# Patient Record
Sex: FEMALE | Race: BLACK OR AFRICAN AMERICAN | NOT HISPANIC OR LATINO | URBAN - METROPOLITAN AREA
[De-identification: names, ages, dates, MRNs, and addresses within clinical notes are randomized per-mention and may not be internally consistent; named-entity substitution may affect disease eponyms.]

---

## 2021-01-21 ENCOUNTER — OFFICE VISIT (OUTPATIENT)
Dept: URBAN - METROPOLITAN AREA CLINIC 35 | Facility: CLINIC | Age: 36
End: 2021-01-21

## 2021-02-03 ENCOUNTER — OFFICE VISIT (OUTPATIENT)
Dept: URBAN - METROPOLITAN AREA CLINIC 31 | Facility: CLINIC | Age: 36
End: 2021-02-03

## 2021-02-03 VITALS
DIASTOLIC BLOOD PRESSURE: 78 MMHG | SYSTOLIC BLOOD PRESSURE: 115 MMHG | OXYGEN SATURATION: 99 % | HEIGHT: 62 IN | WEIGHT: 140 LBS | BODY MASS INDEX: 25.76 KG/M2 | HEART RATE: 64 BPM

## 2021-02-03 PROBLEM — 71457002 OROPHARYNGEAL DYSPHAGIA: Status: ACTIVE | Noted: 2021-02-03

## 2021-02-03 RX ORDER — MULTIVITAMIN
1 TABLET TABLET ORAL ONCE A DAY
Qty: 30 | Status: ACTIVE | COMMUNITY

## 2021-02-03 NOTE — HPI-MIGRATED HPI
;     Acid Reflux : 36 y/o female patient admits recent onset of GERD/acid reflux. Patient admits that she has been experiencing symptoms for about 1 year.  Patient admits mild to severe discomfort in her chest. The symptoms are described as burning sensation of food getting stuck in her chest.  Patient is not taking any medication for relief.  Patient admits going to urgent care due to shortness of breath on (04/2020) and was prescribed antibiotics, which gave her diarrhea. Patient admits taking Benadryl which made her current conditions worse. Patient admits that she has been avoiding jelly, peanut butter, and acidic foods.  Patient admits excessive belching, dysphagia, bloating/gas, indigestion, early satiety.  Patient currently admits formed bowel movements every 3 days Patient denies blood, mucus, or melena in stools.  Patient notes that since she has been drinking Smooth-Move tea, her bowel movements are normalized.   Patient denies globus, sour eructations, changes in appetite, coughing, abdominal/epigastric pain, or changes in bowel habits.    Patient denies family hx of gastric/esophageal cancer or diseases. Patient denies past EGD which was performed in past.   ;

## 2021-02-23 ENCOUNTER — OFFICE VISIT (OUTPATIENT)
Dept: URBAN - METROPOLITAN AREA SURGERY CENTER 8 | Facility: SURGERY CENTER | Age: 36
End: 2021-02-23

## 2021-02-26 ENCOUNTER — TELEPHONE ENCOUNTER (OUTPATIENT)
Dept: URBAN - METROPOLITAN AREA CLINIC 35 | Facility: CLINIC | Age: 36
End: 2021-02-26

## 2021-03-03 ENCOUNTER — TELEPHONE ENCOUNTER (OUTPATIENT)
Dept: URBAN - METROPOLITAN AREA CLINIC 35 | Facility: CLINIC | Age: 36
End: 2021-03-03

## 2021-03-17 ENCOUNTER — DASHBOARD ENCOUNTERS (OUTPATIENT)
Age: 36
End: 2021-03-17

## 2021-03-17 ENCOUNTER — OFFICE VISIT (OUTPATIENT)
Dept: URBAN - METROPOLITAN AREA CLINIC 31 | Facility: CLINIC | Age: 36
End: 2021-03-17

## 2021-03-17 VITALS
HEART RATE: 79 BPM | BODY MASS INDEX: 24.84 KG/M2 | OXYGEN SATURATION: 97 % | WEIGHT: 135 LBS | DIASTOLIC BLOOD PRESSURE: 70 MMHG | SYSTOLIC BLOOD PRESSURE: 100 MMHG | HEIGHT: 62 IN

## 2021-03-17 PROBLEM — 79962008 ESOPHAGEAL SPASM: Status: ACTIVE | Noted: 2021-03-17

## 2021-03-17 PROBLEM — 235595009 GASTROESOPHAGEAL REFLUX DISEASE: Status: ACTIVE | Noted: 2021-03-17

## 2021-03-17 PROBLEM — 14760008 CONSTIPATION: Status: ACTIVE | Noted: 2021-03-17

## 2021-03-17 RX ORDER — MULTIVITAMIN
1 TABLET TABLET ORAL ONCE A DAY
Qty: 30 | Status: ON HOLD | COMMUNITY

## 2021-03-17 RX ORDER — SUCRALFATE 1 G/10ML
10 ML ON AN EMPTY STOMACH SUSPENSION ORAL TWICE A DAY
Qty: 600 | Status: ON HOLD | COMMUNITY

## 2021-03-17 RX ORDER — PANTOPRAZOLE SODIUM 40 MG/1
1 TABLET TABLET, DELAYED RELEASE ORAL ONCE A DAY
Qty: 30 | Status: ON HOLD | COMMUNITY
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
